# Patient Record
Sex: MALE | Race: WHITE | NOT HISPANIC OR LATINO | Employment: OTHER | URBAN - METROPOLITAN AREA
[De-identification: names, ages, dates, MRNs, and addresses within clinical notes are randomized per-mention and may not be internally consistent; named-entity substitution may affect disease eponyms.]

---

## 2017-04-20 ENCOUNTER — PREPPED CHART (OUTPATIENT)
Dept: URBAN - METROPOLITAN AREA CLINIC 39 | Facility: CLINIC | Age: 67
End: 2017-04-20

## 2017-10-12 ASSESSMENT — TONOMETRY
OD_IOP_MMHG: 19
OS_IOP_MMHG: 25

## 2017-10-12 ASSESSMENT — VISUAL ACUITY
OD_SC: 20/30
OS_SC: 20/40+

## 2017-10-31 ENCOUNTER — ESTABLISHED COMPREHENSIVE EXAM (OUTPATIENT)
Dept: URBAN - METROPOLITAN AREA CLINIC 39 | Facility: CLINIC | Age: 67
End: 2017-10-31

## 2017-10-31 VITALS
RESPIRATION RATE: 14 BRPM | HEIGHT: 60 IN | HEART RATE: 64 BPM | DIASTOLIC BLOOD PRESSURE: 88 MMHG | SYSTOLIC BLOOD PRESSURE: 126 MMHG

## 2017-10-31 DIAGNOSIS — H04.123: ICD-10-CM

## 2017-10-31 DIAGNOSIS — Z96.1: ICD-10-CM

## 2017-10-31 DIAGNOSIS — H26.492: ICD-10-CM

## 2017-10-31 DIAGNOSIS — H26.491: ICD-10-CM

## 2017-10-31 PROCEDURE — 92014 COMPRE OPH EXAM EST PT 1/>: CPT

## 2017-10-31 PROCEDURE — G8427 DOCREV CUR MEDS BY ELIG CLIN: HCPCS

## 2017-10-31 PROCEDURE — G8952 PRE-HTN/HTN, NO F/U, NOT GVN: HCPCS

## 2017-10-31 ASSESSMENT — VISUAL ACUITY
OD_SC: 20/50+2
OU_CC: J2
OD_SC: J10
OS_CC: 20/30
OD_CC: J3
OS_CC: J2
OD_CC: 20/50
OD_GLARE: 20/80
OS_SC: 20/50-2
OS_SC: J12
OS_GLARE: 20/80

## 2017-10-31 ASSESSMENT — TONOMETRY
OD_IOP_MMHG: 16
OS_IOP_MMHG: 14

## 2018-01-02 NOTE — PATIENT DISCUSSION
Discussed near vision options: glasses over contact lenses, monovision, and multifocal contact lenses with patient.

## 2018-06-06 ENCOUNTER — ESTABLISHED COMPREHENSIVE EXAM (OUTPATIENT)
Dept: URBAN - METROPOLITAN AREA CLINIC 39 | Facility: CLINIC | Age: 68
End: 2018-06-06

## 2018-06-06 VITALS
RESPIRATION RATE: 14 BRPM | DIASTOLIC BLOOD PRESSURE: 31 MMHG | SYSTOLIC BLOOD PRESSURE: 172 MMHG | HEIGHT: 60 IN | HEART RATE: 62 BPM

## 2018-06-06 DIAGNOSIS — H26.491: ICD-10-CM

## 2018-06-06 DIAGNOSIS — H04.123: ICD-10-CM

## 2018-06-06 DIAGNOSIS — Z96.1: ICD-10-CM

## 2018-06-06 DIAGNOSIS — H26.492: ICD-10-CM

## 2018-06-06 PROCEDURE — G8952 PRE-HTN/HTN, NO F/U, NOT GVN: HCPCS

## 2018-06-06 PROCEDURE — 92015 DETERMINE REFRACTIVE STATE: CPT

## 2018-06-06 PROCEDURE — G8427 DOCREV CUR MEDS BY ELIG CLIN: HCPCS

## 2018-06-06 PROCEDURE — 92014 COMPRE OPH EXAM EST PT 1/>: CPT

## 2018-06-06 ASSESSMENT — VISUAL ACUITY
OS_GLARE: 20/80
OD_CC: 20/40
OD_SC: 20/50
OD_CC: J4
OS_CC: J4
OS_SC: <J12
OD_SC: <J12
OS_CC: 20/40
OS_SC: 20/50
OD_GLARE: 20/80

## 2018-06-06 ASSESSMENT — TONOMETRY
OS_IOP_MMHG: 15
OD_IOP_MMHG: 15

## 2018-12-04 ENCOUNTER — ESTABLISHED COMPREHENSIVE EXAM (OUTPATIENT)
Dept: URBAN - METROPOLITAN AREA CLINIC 39 | Facility: CLINIC | Age: 68
End: 2018-12-04

## 2018-12-04 VITALS
SYSTOLIC BLOOD PRESSURE: 139 MMHG | DIASTOLIC BLOOD PRESSURE: 82 MMHG | RESPIRATION RATE: 14 BRPM | HEIGHT: 60 IN | HEART RATE: 66 BPM

## 2018-12-04 DIAGNOSIS — H04.123: ICD-10-CM

## 2018-12-04 DIAGNOSIS — H26.493: ICD-10-CM

## 2018-12-04 DIAGNOSIS — Z96.1: ICD-10-CM

## 2018-12-04 PROCEDURE — G8952 PRE-HTN/HTN, NO F/U, NOT GVN: HCPCS

## 2018-12-04 PROCEDURE — G9902 PT SCRN TBCO AND ID AS USER: HCPCS

## 2018-12-04 PROCEDURE — 92014 COMPRE OPH EXAM EST PT 1/>: CPT

## 2018-12-04 PROCEDURE — G8428 CUR MEDS NOT DOCUMENT: HCPCS

## 2018-12-04 PROCEDURE — 92015 DETERMINE REFRACTIVE STATE: CPT

## 2018-12-04 ASSESSMENT — VISUAL ACUITY
OS_CC: J4
OS_SC: 20/40
OD_SC: 20/40
OD_CC: J4
OS_SC: J12
OS_CC: 20/40+1
OD_CC: 20/40-2
OS_GLARE: 20/80
OD_GLARE: 20/100
OD_SC: J12

## 2018-12-04 ASSESSMENT — TONOMETRY
OD_IOP_MMHG: 15
OS_IOP_MMHG: 16

## 2018-12-06 ENCOUNTER — SURGERY/PROCEDURE (OUTPATIENT)
Dept: URBAN - METROPOLITAN AREA SURGERY 14 | Facility: SURGERY | Age: 68
End: 2018-12-06

## 2018-12-06 PROCEDURE — 6682150 YAG CAPSULOTOMY

## 2018-12-07 ENCOUNTER — YAG POST-OP (OUTPATIENT)
Dept: URBAN - METROPOLITAN AREA CLINIC 39 | Facility: CLINIC | Age: 68
End: 2018-12-07

## 2018-12-07 DIAGNOSIS — Z98.890: ICD-10-CM

## 2018-12-07 PROCEDURE — 99024 POSTOP FOLLOW-UP VISIT: CPT

## 2018-12-07 RX ORDER — BRIMONIDINE TARTRATE, TIMOLOL MALEATE 2; 5 MG/ML; MG/ML
1 SOLUTION/ DROPS OPHTHALMIC TWICE A DAY
Start: 2018-12-07

## 2018-12-07 ASSESSMENT — VISUAL ACUITY
OD_SC: J10
OS_CC: 20/40
OD_CC: 20/40
OD_SC: 20/40
OD_CC: J5
OS_SC: 20/40
OS_SC: J8
OS_CC: J5

## 2018-12-07 ASSESSMENT — TONOMETRY
OS_IOP_MMHG: 15
OD_IOP_MMHG: 38
OS_IOP_MMHG: 17
OD_IOP_MMHG: 32

## 2018-12-11 ENCOUNTER — IOP CHECK (OUTPATIENT)
Dept: URBAN - METROPOLITAN AREA CLINIC 39 | Facility: CLINIC | Age: 68
End: 2018-12-11

## 2018-12-11 DIAGNOSIS — Z98.890: ICD-10-CM

## 2018-12-11 PROCEDURE — 99024 POSTOP FOLLOW-UP VISIT: CPT

## 2018-12-11 ASSESSMENT — VISUAL ACUITY
OS_CC: 20/30
OD_CC: 20/40-1

## 2018-12-11 ASSESSMENT — TONOMETRY
OD_IOP_MMHG: 13
OS_IOP_MMHG: 14

## 2019-04-16 NOTE — PATIENT DISCUSSION
PHOTOGRAPHS: I have reviewed the external ocular photographs of this patient which show the following: lesion left lower eyelid.

## 2020-10-30 NOTE — PATIENT DISCUSSION
"Patient is here today to further discuss excision of lesion left lower eyelid, she is allergic to all ""germaine"" medications and would like to know if she has any further options to have procedure done. Recommend referral to [facility] to discuss the possibility of lesion excision or to general plastic surgeon who has a surgical suite and may have an anesthesiologist onsite. "

## 2021-07-28 ENCOUNTER — ESTABLISHED COMPREHENSIVE EXAM (OUTPATIENT)
Dept: URBAN - METROPOLITAN AREA CLINIC 39 | Facility: CLINIC | Age: 71
End: 2021-07-28

## 2021-07-28 DIAGNOSIS — Z98.890: ICD-10-CM

## 2021-07-28 DIAGNOSIS — Z96.1: ICD-10-CM

## 2021-07-28 DIAGNOSIS — H49.12: ICD-10-CM

## 2021-07-28 PROCEDURE — 92015 DETERMINE REFRACTIVE STATE: CPT

## 2021-07-28 PROCEDURE — 92250 FUNDUS PHOTOGRAPHY W/I&R: CPT

## 2021-07-28 PROCEDURE — 92014 COMPRE OPH EXAM EST PT 1/>: CPT

## 2021-07-28 ASSESSMENT — VISUAL ACUITY
OS_CC: J6
OD_SC: J12
OS_SC: J12
OS_CC: 20/50
OD_CC: J6
OD_CC: 20/50
OS_SC: 20/60
OD_SC: 20/70

## 2021-07-28 ASSESSMENT — TONOMETRY
OD_IOP_MMHG: 18
OS_IOP_MMHG: 19

## 2022-07-28 ENCOUNTER — PREPPED CHART (OUTPATIENT)
Dept: URBAN - METROPOLITAN AREA CLINIC 39 | Facility: CLINIC | Age: 72
End: 2022-07-28
